# Patient Record
Sex: MALE | URBAN - METROPOLITAN AREA
[De-identification: names, ages, dates, MRNs, and addresses within clinical notes are randomized per-mention and may not be internally consistent; named-entity substitution may affect disease eponyms.]

---

## 2022-01-04 ENCOUNTER — IMPORTED ENCOUNTER (OUTPATIENT)
Dept: URBAN - METROPOLITAN AREA CLINIC 31 | Facility: CLINIC | Age: 57
End: 2022-01-04

## 2022-01-04 PROBLEM — H40.013: Noted: 2022-01-04

## 2022-01-04 PROBLEM — E11.9: Noted: 2022-01-04

## 2022-01-04 PROBLEM — H25.13: Noted: 2022-01-04

## 2022-01-04 PROCEDURE — 92015 DETERMINE REFRACTIVE STATE: CPT

## 2022-01-04 PROCEDURE — 99204 OFFICE O/P NEW MOD 45 MIN: CPT

## 2022-01-04 NOTE — PATIENT DISCUSSION
1. DM II without sign of Diabetic Retinopathy OU:  Discussed the pathophysiology of diabetes and its effect on the eye. Stressed the importance of regular followup and good control of BS BP and Lipids to avoid future complications. 2. Nuclear Sclerotic Cataract OU: Explained how cataracts can effect vision. Recommend clinical observation. The patient was advised to contact us if any change or worsening of vision. 3. Glaucoma suspect OU - No signs of glaucoma starting based on todays examination and testing. Will continue to monitor for glaucoma development.

## 2022-04-02 ASSESSMENT — VISUAL ACUITY
OD_CC: 20/200
OS_CC: 20/80-1
OS_SC: 20/30
OD_SC: 20/30

## 2022-04-02 ASSESSMENT — TONOMETRY
OS_IOP_MMHG: 15
OD_IOP_MMHG: 15

## 2022-10-04 ENCOUNTER — PREPPED CHART (OUTPATIENT)
Dept: URBAN - METROPOLITAN AREA CLINIC 29 | Facility: CLINIC | Age: 57
End: 2022-10-04

## 2022-10-04 NOTE — PATIENT DISCUSSION
No signs of glaucoma starting based on todays examination and testing. Will continue to monitor for glaucoma development.